# Patient Record
(demographics unavailable — no encounter records)

---

## 2024-12-17 NOTE — PHYSICAL EXAM
[General Appearance - Alert] : alert [General Appearance - In No Acute Distress] : in no acute distress [General Appearance - Well-Appearing] : healthy appearing [Sclera] : the sclera and conjunctiva were normal [Outer Ear] : the ears and nose were normal in appearance [Hearing Threshold Finger Rub Not [place]] : hearing was normal [Neck Appearance] : the appearance of the neck was normal [Exaggerated Use Of Accessory Muscles For Inspiration] : no accessory muscle use [Auscultation Breath Sounds / Voice Sounds] : lungs were clear to auscultation bilaterally [Heart Sounds] : normal S1 and S2 [Murmurs] : no murmurs [Edema] : there was no peripheral edema [Bowel Sounds] : normal bowel sounds [Abdomen Soft] : soft [Abdomen Tenderness] : non-tender [No CVA Tenderness] : no ~M costovertebral angle tenderness [Abnormal Walk] : normal gait [Nail Clubbing] : no clubbing  or cyanosis of the fingernails [FreeTextEntry1] : joint deformities of b/l fingers and right wrist  [] : no rash [No Focal Deficits] : no focal deficits [Oriented To Time, Place, And Person] : oriented to person, place, and time [Impaired Insight] : insight and judgment were intact

## 2024-12-17 NOTE — ASSESSMENT
[FreeTextEntry1] : 35-year-old  woman with biopsy proven lupus nephritis (class IV) s/p induction therapy with steroids and Cellcept Disseminated Varicella chicken- s/p Valtrex Now in remission Normal albumin, UA, no protein/ microhematuria Continue MMF 1.5 gm daily and     Iron def anemia on iron supplements  Vitamin D insufficiency Continue vitamin D supplements  RTC in 6 months.

## 2024-12-17 NOTE — REVIEW OF SYSTEMS
[Feeling Poorly] : not feeling poorly [Feeling Tired] : not feeling tired [Recent Weight Gain (___ Lbs)] : no recent weight gain [Recent Weight Loss (___ Lbs)] : no recent weight loss [Dysuria] : no dysuria [Joint Swelling] : no joint swelling [Joint Stiffness] : no joint stiffness [Skin Lesions] : skin lesion [Dizziness] : no dizziness [Fainting] : no fainting [Anxiety] : no anxiety [Depression] : no depression [Muscle Weakness] : no muscle weakness [Easy Bleeding] : no tendency for easy bleeding [Easy Bruising] : no tendency for easy bruising [Negative] : Heme/Lymph [de-identified] : dried lesions -generalized

## 2024-12-17 NOTE — HISTORY OF PRESENT ILLNESS
[FreeTextEntry1] : Pt is a 36 year old woman with ((Class IV LN, dsDNA crithidia+) and RA (RF, CCP+, 14-3-3eta). Pt had presented for proteinuria 0.6 gm in , s/p kidney biopsy in 08/2020 which showed class IV lupus nephritis, she was treated with Solumedrol 1 gm for 3 days and MMF. Clinical course further complicated by disseminated varicella infection- MMF was stopped but she is now back on it. Tapered off steroids.  Today, pt presents for follow up of lupus nephritis. Pt seen and examined feels well, no recent illness/ hospitalization/ illness No recent lupus flares She is on MMF 1500 mg bid and  Denies hematuria, foamy urine, leg edema.   SH: Originally rom [facility]; immigrated  in 2005- works in a factory packaging beauty supplies   2 kids- 16 year old daughter and 11 year old son PCP: Nima Brown  [phone])  ----------------------------------------- 12/07 pt presents for follow up Feels well no acute complaint Lupus has been quiet Denies hematuria, foamy urine -----------------------------------------------------  06/11/2024 pt presents for follow up Feels well no acute complaint Lupus has been quiescent MMF was decreased to 1500 mg daily- also on  mg daily --------------------------- 12/17/2024 Pt presents for follow up She is currently recovering from a cold   No issues with lupus- she is on MMF 1500 mg daily and  mg daily

## 2025-07-02 NOTE — ASSESSMENT
[FreeTextEntry1] : 35-year-old  woman with biopsy proven lupus nephritis (class IV) s/p induction therapy with steroids and Cellcept Disseminated Varicella chicken- s/p Valtrex Now in remission Normal albumin, UA, no protein/ microhematuria Continue MMF 1.5 gm daily and  obtain UA, UACR  Iron def anemia on iron supplements  Vitamin D insufficiency Continue vitamin D supplements  RTC in 6 months.

## 2025-07-02 NOTE — REVIEW OF SYSTEMS
[Skin Lesions] : skin lesion [Negative] : Heme/Lymph [Feeling Poorly] : not feeling poorly [Feeling Tired] : not feeling tired [Recent Weight Gain (___ Lbs)] : no recent weight gain [Recent Weight Loss (___ Lbs)] : no recent weight loss [Dysuria] : no dysuria [Joint Swelling] : no joint swelling [Joint Stiffness] : no joint stiffness [Dizziness] : no dizziness [Fainting] : no fainting [Anxiety] : no anxiety [Depression] : no depression [Muscle Weakness] : no muscle weakness [Easy Bleeding] : no tendency for easy bleeding [Easy Bruising] : no tendency for easy bruising [de-identified] : dried lesions -generalized

## 2025-07-02 NOTE — PHYSICAL EXAM
[General Appearance - Alert] : alert [General Appearance - In No Acute Distress] : in no acute distress [General Appearance - Well-Appearing] : healthy appearing [Sclera] : the sclera and conjunctiva were normal [Outer Ear] : the ears and nose were normal in appearance [Hearing Threshold Finger Rub Not [place]] : hearing was normal [Neck Appearance] : the appearance of the neck was normal [Exaggerated Use Of Accessory Muscles For Inspiration] : no accessory muscle use [Auscultation Breath Sounds / Voice Sounds] : lungs were clear to auscultation bilaterally [Heart Sounds] : normal S1 and S2 [Murmurs] : no murmurs [Edema] : there was no peripheral edema [Bowel Sounds] : normal bowel sounds [Abdomen Soft] : soft [Abdomen Tenderness] : non-tender [No CVA Tenderness] : no ~M costovertebral angle tenderness [Abnormal Walk] : normal gait [Nail Clubbing] : no clubbing  or cyanosis of the fingernails [] : no rash [No Focal Deficits] : no focal deficits [Oriented To Time, Place, And Person] : oriented to person, place, and time [Impaired Insight] : insight and judgment were intact [FreeTextEntry1] : joint deformities of b/l fingers and right wrist

## 2025-07-02 NOTE — HISTORY OF PRESENT ILLNESS
[FreeTextEntry1] : Pt is a 36 year old woman with ((Class IV LN, dsDNA crithidia+) and RA (RF, CCP+, 14-3-3eta). Pt had presented for proteinuria 0.6 gm in , s/p kidney biopsy in 08/2020 which showed class IV lupus nephritis, she was treated with Solumedrol 1 gm for 3 days and MMF. Clinical course further complicated by disseminated varicella infection- MMF was stopped but she is now back on it. Tapered off steroids.  Today, pt presents for follow up of lupus nephritis. Pt seen and examined feels well, no recent illness/ hospitalization/ illness No recent lupus flares She is on MMF 1500 mg bid and  Denies hematuria, foamy urine, leg edema.   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   2 kids- 16 year old daughter and 11 year old son PCP: Nima Brown  [phone])  ----------------------------------------- 12/07 pt presents for follow up Feels well no acute complaint Lupus has been quiet Denies hematuria, foamy urine -----------------------------------------------------  06/11/2024 pt presents for follow up Feels well no acute complaint Lupus has been quiescent MMF was decreased to 1500 mg daily- also on  mg daily --------------------------- 12/17/2024 Pt presents for follow up She is currently recovering from a cold   No issues with lupus- she is on MMF 1500 mg daily and  mg daily --------------------------------------- 07/01/2025 Pt presents for follow up for lupus nephritis Pt has been feeling well- no recent lupus flare. She is on MMF 1500 mg daily and  mg daily  Denies hematuria, foamy urine and leg edema.